# Patient Record
Sex: MALE | Race: BLACK OR AFRICAN AMERICAN | URBAN - METROPOLITAN AREA
[De-identification: names, ages, dates, MRNs, and addresses within clinical notes are randomized per-mention and may not be internally consistent; named-entity substitution may affect disease eponyms.]

---

## 2024-04-07 RX ORDER — MELOXICAM 15 MG/1
15 TABLET ORAL DAILY
COMMUNITY
Start: 2023-12-29 | End: 2024-04-12

## 2024-04-07 RX ORDER — AMLODIPINE BESYLATE 5 MG/1
5 TABLET ORAL DAILY
COMMUNITY
Start: 2019-09-09 | End: 2024-04-12

## 2024-04-07 RX ORDER — METHOCARBAMOL 500 MG/1
500 TABLET, FILM COATED ORAL 4 TIMES DAILY
COMMUNITY
Start: 2019-08-09 | End: 2024-04-12

## 2024-04-07 NOTE — PROGRESS NOTES
Office Note: Establish Care  2024  Patient Name: Lance Lyn  MRN: [id number] : 1965    SUBJECTIVE:     CHIEF COMPLAINT:  Chief Complaint   Patient presents with    [facility]     Right knee and shin pain for over a month, had knee surgery in [facility] circa . Patient was not seen for either of these pains but he was having hip pain as well and wraps it often he sometimes gets hip pain when he sits on a high stool. Would also like to talk about possible diabetes.      HISTORY OF PRESENT ILLNESS:  Patient is a 58 y.o. male with a PMH of OA who presents today to [facility].    Patient's acute concern today is regarding an extremely dry right hand. 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 He is right handed but cannot think of anything he touches exclusively with his right hand that would cause the dryness. He puts lotion on every night and also uses Vaseline on top of the lotion. He does not work on the weekends and says that by the time he uses the lotion and Vaseline on Friday night, Saturday and , his right hand starts to become less dry, but the minute he is back at work on Monday, the dryness resumes.    Patient also reports chronic right knee/leg pain. He had surgery on this knee when he was younger. He initially started having pain in his right hip/buttock that would shoot down the back of his thigh, which he said he knew was sciatica. This started a couple of months ago, but resolved. Now as of about 3 weeks ago, he is only having pain along the lateral aspect of his knee which is also extending down along the side of his lower leg. He has had some swelling, though this resolved about a week ago. He describes the pain as a sharp pinch. He did seek care for this leg pain back in December in the ER. An x-ray was obtained at that time

## 2024-04-09 ENCOUNTER — OFFICE VISIT (OUTPATIENT)
Dept: FAMILY MEDICINE CLINIC | Age: 59
End: 2024-04-09
Payer: COMMERCIAL

## 2024-04-09 VITALS
HEART RATE: 81 BPM | SYSTOLIC BLOOD PRESSURE: 134 MMHG | WEIGHT: 164.6 LBS | OXYGEN SATURATION: 97 % | DIASTOLIC BLOOD PRESSURE: 82 MMHG | HEIGHT: 68 IN | BODY MASS INDEX: 24.95 KG/M2 | TEMPERATURE: 99 F

## 2024-04-09 DIAGNOSIS — Z00.00 ANNUAL PHYSICAL EXAM: ICD-10-CM

## 2024-04-09 DIAGNOSIS — Z71.82 EXERCISE COUNSELING: ICD-10-CM

## 2024-04-09 DIAGNOSIS — M25.561 ACUTE PAIN OF RIGHT KNEE: ICD-10-CM

## 2024-04-09 DIAGNOSIS — Z76.89 ENCOUNTER TO ESTABLISH CARE: Primary | ICD-10-CM

## 2024-04-09 DIAGNOSIS — Z71.3 DIETARY COUNSELING: ICD-10-CM

## 2024-04-09 DIAGNOSIS — L85.3 DRY SKIN DERMATITIS: ICD-10-CM

## 2024-04-09 PROCEDURE — 99203 OFFICE O/P NEW LOW 30 MIN: CPT | Performed by: STUDENT IN AN ORGANIZED HEALTH CARE EDUCATION/TRAINING PROGRAM

## 2024-04-09 PROCEDURE — 99386 PREV VISIT NEW AGE 40-64: CPT | Performed by: STUDENT IN AN ORGANIZED HEALTH CARE EDUCATION/TRAINING PROGRAM

## 2024-04-09 RX ORDER — AMOXICILLIN 500 MG/1
500 CAPSULE ORAL 3 TIMES DAILY
COMMUNITY
End: 2024-04-09 | Stop reason: ALTCHOICE

## 2024-04-09 RX ORDER — CYCLOBENZAPRINE HCL 5 MG
5 TABLET ORAL 3 TIMES DAILY PRN
COMMUNITY

## 2024-04-09 SDOH — ECONOMIC STABILITY: HOUSING INSECURITY
IN THE LAST 12 MONTHS, WAS THERE A TIME WHEN YOU DID NOT HAVE A STEADY PLACE TO SLEEP OR SLEPT IN A SHELTER (INCLUDING NOW)?: NO

## 2024-04-09 SDOH — ECONOMIC STABILITY: FOOD INSECURITY: WITHIN THE PAST 12 MONTHS, YOU WORRIED THAT YOUR FOOD WOULD RUN OUT BEFORE YOU GOT MONEY TO BUY MORE.: NEVER TRUE

## 2024-04-09 SDOH — ECONOMIC STABILITY: FOOD INSECURITY: WITHIN THE PAST 12 MONTHS, THE FOOD YOU BOUGHT JUST DIDN'T LAST AND YOU DIDN'T HAVE MONEY TO GET MORE.: NEVER TRUE

## 2024-04-09 SDOH — ECONOMIC STABILITY: INCOME INSECURITY: HOW HARD IS IT FOR YOU TO PAY FOR THE VERY BASICS LIKE FOOD, HOUSING, MEDICAL CARE, AND HEATING?: NOT HARD AT ALL

## 2024-04-09 ASSESSMENT — PATIENT HEALTH QUESTIONNAIRE - PHQ9
2. FEELING DOWN, DEPRESSED OR HOPELESS: NOT AT ALL
SUM OF ALL RESPONSES TO PHQ QUESTIONS 1-9: 0
1. LITTLE INTEREST OR PLEASURE IN DOING THINGS: NOT AT ALL
SUM OF ALL RESPONSES TO PHQ9 QUESTIONS 1 & 2: 0
SUM OF ALL RESPONSES TO PHQ QUESTIONS 1-9: 0

## 2024-04-16 ENCOUNTER — OFFICE VISIT (OUTPATIENT)
Dept: ORTHOPEDIC SURGERY | Age: 59
End: 2024-04-16
Payer: COMMERCIAL

## 2024-04-16 VITALS — HEIGHT: 68 IN | WEIGHT: 164 LBS | BODY MASS INDEX: 24.86 KG/M2

## 2024-04-16 DIAGNOSIS — M17.31 POST-TRAUMATIC OSTEOARTHRITIS OF RIGHT KNEE: Primary | ICD-10-CM

## 2024-04-16 DIAGNOSIS — M25.561 CHRONIC PAIN OF RIGHT KNEE: ICD-10-CM

## 2024-04-16 DIAGNOSIS — G89.29 CHRONIC PAIN OF RIGHT KNEE: ICD-10-CM

## 2024-04-16 PROCEDURE — 99203 OFFICE O/P NEW LOW 30 MIN: CPT | Performed by: ORTHOPAEDIC SURGERY

## 2024-04-16 NOTE — PROGRESS NOTES
CHIEF COMPLAINT: Right knee pain.    History:   Lance Lyn is a 58 y.o. male referred by Brunilda Jensen DO for evaluation and treatment of right knee pain / injury. The patient complains of right knee pain.   This is evaluated as a personal injury.  He does have a history of right knee arthroscopy in 1986.  He states he has not had any significant issues since that time.  The pain began 4 months ago.  There was not a history of injury.  He woke up and had swelling his knee.  He was seen in [facility] ER at that time.  Rates pain 4/10.   The pain is located medial and anterior.  Symptoms are worse with running, pivoting/twisting.  No pain with standing for long time.  The knee does sometimes feel like it wants to give out.  The patient can bend and straighten the knee fully.   There was not catching / locking of the knee.   The patient has not had PT. The patient has not had an injection.   The patient has taken NSAIDs, ibuprofen with relief. The patient has tried ice, with relief.   The patient's occupation is .      No past medical history on file.    Past Surgical History:   Procedure Laterality Date    KNEE SURGERY      RIGHT       No family history on file.    Social History     Socioeconomic History    Marital status: Single     Spouse name: None    Number of children: None    Years of education: None    Highest education level: None   Tobacco Use    Smoking status: Every Day     Types: Cigars    Smokeless tobacco: Never   Substance and Sexual Activity    Alcohol use: Yes     Comment: 1 BEER A DAY    Drug use: No     Social Determinants of Health     Financial Resource Strain: Low Risk  (4/9/2024)    Overall Financial Resource Strain (CARDIA)     Difficulty of Paying Living Expenses: Not hard at all   Food Insecurity: No Food Insecurity (4/9/2024)    Hunger Vital Sign     Worried About Running Out of Food in the Last Year: Never true     Ran Out of Food in the Last Year: Never true

## 2024-08-08 ENCOUNTER — OFFICE VISIT (OUTPATIENT)
Dept: ORTHOPEDIC SURGERY | Age: 59
End: 2024-08-08
Payer: COMMERCIAL

## 2024-08-08 ENCOUNTER — TELEPHONE (OUTPATIENT)
Dept: ORTHOPEDIC SURGERY | Age: 59
End: 2024-08-08

## 2024-08-08 VITALS — HEIGHT: 68 IN | BODY MASS INDEX: 24.25 KG/M2 | RESPIRATION RATE: 16 BRPM | WEIGHT: 160 LBS

## 2024-08-08 DIAGNOSIS — S89.91XA INJURY OF RIGHT KNEE, INITIAL ENCOUNTER: ICD-10-CM

## 2024-08-08 DIAGNOSIS — M17.31 POST-TRAUMATIC OSTEOARTHRITIS OF RIGHT KNEE: Primary | ICD-10-CM

## 2024-08-08 DIAGNOSIS — M25.561 RIGHT KNEE PAIN, UNSPECIFIED CHRONICITY: ICD-10-CM

## 2024-08-08 PROCEDURE — 99213 OFFICE O/P EST LOW 20 MIN: CPT | Performed by: ORTHOPAEDIC SURGERY

## 2024-08-08 NOTE — PROGRESS NOTES
CHIEF COMPLAINT: Right knee pain.    History:   Lance Lyn is a 59 y.o. male referred by Brunilda Jensen DO for evaluation and treatment of right knee pain / injury. The patient complains of right knee pain.   This is evaluated as a personal injury.  He does have a history of right knee arthroscopy in 1986.  He states he has not had any significant issues since that time.  The pain began 4 months ago.  There was not a history of injury.  He woke up and had swelling his knee.  He was seen in [facility] ER at that time.  Rates pain 4/10.   The pain is located medial and anterior.  Symptoms are worse with running, pivoting/twisting.  No pain with standing for long time.  The knee does sometimes feel like it wants to give out.  The patient can bend and straighten the knee fully.   There was not catching / locking of the knee.   The patient has not had PT. The patient has not had an injection.   The patient has taken NSAIDs, ibuprofen with relief. The patient has tried ice, with relief.   The patient's occupation is .    Interval History: His knee has been doing okay since we last saw him about 4 months ago.  Last week a pole fell and hit his knee.  He has had increased pain since then and significant swelling within the knee.           No past medical history on file.    Past Surgical History:   Procedure Laterality Date    KNEE SURGERY      RIGHT       No family history on file.    Social History     Socioeconomic History    Marital status: Single   Tobacco Use    Smoking status: Every Day     Types: Cigars    Smokeless tobacco: Never   Substance and Sexual Activity    Alcohol use: Yes     Comment: 1 BEER A DAY    Drug use: No     Social Determinants of Health     Financial Resource Strain: Low Risk  (4/9/2024)    Overall Financial Resource Strain (CARDIA)     Difficulty of Paying Living Expenses: Not hard at all   Food Insecurity: No Food Insecurity (4/9/2024)    Hunger Vital Sign     Worried

## 2024-08-08 NOTE — TELEPHONE ENCOUNTER
MRI is authorized for [employer].    Order, authorization and demographics faxed.     for patient re: MRI authorization. Patient is advised that authorization is valid through 9/6/2024 and PS should reach out to them to schedule. If they do not hear from PS by tomorrow, call PS at [phone]  to schedule the MRI. An in office appointment will be required a minimum of three business days after the MRI to obtain results and treatment options.

## 2024-08-09 NOTE — TELEPHONE ENCOUNTER
[facility], Scheduled follow up appointment.

## 2024-08-14 ENCOUNTER — TELEPHONE (OUTPATIENT)
Dept: ORTHOPEDIC SURGERY | Age: 59
End: 2024-08-14

## 2024-08-14 PROBLEM — S80.01XA CONTUSION OF KNEE, RIGHT: Status: ACTIVE | Noted: 2024-08-14

## 2024-08-14 NOTE — TELEPHONE ENCOUNTER
Notified this is an OBTW (oh by the way this is workers comp), patient just filed or notified us they have a workers comp claim.  A medco14 is necessary for the date(s) of service: 8.8.2024

## 2024-08-21 ENCOUNTER — TELEPHONE (OUTPATIENT)
Dept: ORTHOPEDIC SURGERY | Age: 59
End: 2024-08-21

## 2024-08-21 NOTE — TELEPHONE ENCOUNTER
Spoke with patient re: approved C9 for MRI. Asked that he call Central Scheduling at [phone] to schedule MRI. Reminded him that an in office appointment is required a minimum of three business days after the MRI to discuss results and treatment options.    Patient verbalized understanding and agreed.

## 2024-08-29 ENCOUNTER — HOSPITAL ENCOUNTER (OUTPATIENT)
Dept: MRI IMAGING | Age: 59
Discharge: HOME | End: 2024-08-29
Attending: ORTHOPAEDIC SURGERY
Payer: COMMERCIAL

## 2024-08-29 DIAGNOSIS — M25.561 RIGHT KNEE PAIN, UNSPECIFIED CHRONICITY: ICD-10-CM

## 2024-08-29 PROCEDURE — 73721 MRI JNT OF LWR EXTRE W/O DYE: CPT

## 2024-09-03 ENCOUNTER — OFFICE VISIT (OUTPATIENT)
Dept: ORTHOPEDIC SURGERY | Age: 59
End: 2024-09-03

## 2024-09-03 VITALS — HEIGHT: 68 IN | WEIGHT: 160 LBS | BODY MASS INDEX: 24.25 KG/M2

## 2024-09-03 DIAGNOSIS — S80.01XA CONTUSION OF RIGHT KNEE, INITIAL ENCOUNTER: Primary | ICD-10-CM

## 2024-09-03 NOTE — PROGRESS NOTES
Comment: 1 BEER A DAY    Drug use: No     Social Determinants of Health     Financial Resource Strain: Low Risk  (4/9/2024)    Overall Financial Resource Strain (CARDIA)     Difficulty of Paying Living Expenses: Not hard at all   Food Insecurity: No Food Insecurity (4/9/2024)    Hunger Vital Sign     Worried About Running Out of Food in the Last Year: Never true     Ran Out of Food in the Last Year: Never true   Transportation Needs: Unknown (4/9/2024)    PRAPARE - Transportation     Lack of Transportation (Non-Medical): No    Received from [facility]   Housing Stability: Unknown (4/9/2024)    Housing Stability Vital Sign     Unstable Housing in the Last Year: No       Current Outpatient Medications   Medication Sig Dispense Refill    cyclobenzaprine (FLEXERIL) 5 MG tablet Take 1 tablet by mouth 3 times daily as needed for Muscle spasms       No current facility-administered medications for this visit.       No Known Allergies      Physical Examination:     Vital signs:   Height 1.727 m (5' 8\"), weight 72.6 kg (160 lb).    General:  alert, appears stated age, cooperative, and no distress   Gait:  Normal. The patient can bear weight on the injured extremity.   He did have tenderness to palpation along his distal femur        Imaging:  Right Knee X-Ray 4/16/24: No fracture, dislocation, lytic lesion.  Bone-on-bone lateral compartment.  Tricompartment osteophytes    Right knee x-rays 8/8/2024:  No obvious fracture or dislocation.  Knee effusion    Right knee MRI: I independently reviewed the images, as well as the radiology report.   1. Degeneration and tearing to the body and posterior horn of the medial and  lateral meniscus.  2. Advanced lateral compartment arthrosis with bone-on-bone contact.  3. Edema within the anteromedial femoral condyle consistent with contusion.  No discrete fracture line.  4. Large joint effusion, synovitis and debris..      Assessment:     Right knee contusion  Right

## 2024-09-05 ENCOUNTER — TELEPHONE (OUTPATIENT)
Dept: ORTHOPEDIC SURGERY | Age: 59
End: 2024-09-05